# Patient Record
Sex: FEMALE | Race: WHITE | NOT HISPANIC OR LATINO | Employment: UNEMPLOYED | URBAN - NONMETROPOLITAN AREA
[De-identification: names, ages, dates, MRNs, and addresses within clinical notes are randomized per-mention and may not be internally consistent; named-entity substitution may affect disease eponyms.]

---

## 2022-05-17 ENCOUNTER — HOSPITAL ENCOUNTER (EMERGENCY)
Facility: HOSPITAL | Age: 22
Discharge: LEFT BEFORE DISCHARGE | End: 2022-05-17
Attending: EMERGENCY MEDICINE

## 2022-05-17 VITALS
RESPIRATION RATE: 20 BRPM | SYSTOLIC BLOOD PRESSURE: 141 MMHG | WEIGHT: 138.67 LBS | DIASTOLIC BLOOD PRESSURE: 96 MMHG | HEART RATE: 99 BPM | OXYGEN SATURATION: 98 % | HEIGHT: 62 IN | BODY MASS INDEX: 25.52 KG/M2 | TEMPERATURE: 97.9 F

## 2022-05-17 DIAGNOSIS — O46.90 VAGINAL BLEEDING IN PREGNANCY, UNSPECIFIED TRIMESTER: Primary | ICD-10-CM

## 2022-05-17 LAB
ABO GROUP BLD: NORMAL
ABO GROUP BLD: NORMAL
ALBUMIN SERPL BCP-MCNC: 2.6 G/DL (ref 3.5–5)
ALP SERPL-CCNC: 208 U/L (ref 46–116)
ALT SERPL W P-5'-P-CCNC: 30 U/L (ref 12–78)
ANION GAP SERPL CALCULATED.3IONS-SCNC: 12 MMOL/L (ref 4–13)
AST SERPL W P-5'-P-CCNC: 14 U/L (ref 5–45)
BASOPHILS # BLD MANUAL: 0 THOUSAND/UL (ref 0–0.1)
BASOPHILS NFR MAR MANUAL: 0 % (ref 0–1)
BILIRUB SERPL-MCNC: 0.27 MG/DL (ref 0.2–1)
BLD GP AB SCN SERPL QL: NEGATIVE
BUN SERPL-MCNC: 9 MG/DL (ref 5–25)
CALCIUM ALBUM COR SERPL-MCNC: 10 MG/DL (ref 8.3–10.1)
CALCIUM SERPL-MCNC: 8.9 MG/DL (ref 8.3–10.1)
CHLORIDE SERPL-SCNC: 101 MMOL/L (ref 100–108)
CO2 SERPL-SCNC: 23 MMOL/L (ref 21–32)
CREAT SERPL-MCNC: 0.65 MG/DL (ref 0.6–1.3)
EOSINOPHIL # BLD MANUAL: 0.66 THOUSAND/UL (ref 0–0.4)
EOSINOPHIL NFR BLD MANUAL: 2 % (ref 0–6)
ERYTHROCYTE [DISTWIDTH] IN BLOOD BY AUTOMATED COUNT: 13.8 % (ref 11.6–15.1)
GFR SERPL CREATININE-BSD FRML MDRD: 126 ML/MIN/1.73SQ M
GIANT PLATELETS BLD QL SMEAR: PRESENT
GLUCOSE SERPL-MCNC: 118 MG/DL (ref 65–140)
HCT VFR BLD AUTO: 33.5 % (ref 34.8–46.1)
HGB BLD-MCNC: 11.1 G/DL (ref 11.5–15.4)
LYMPHOCYTES # BLD AUTO: 18 % (ref 14–44)
LYMPHOCYTES # BLD AUTO: 5.97 THOUSAND/UL (ref 0.6–4.47)
MCH RBC QN AUTO: 29 PG (ref 26.8–34.3)
MCHC RBC AUTO-ENTMCNC: 33.1 G/DL (ref 31.4–37.4)
MCV RBC AUTO: 88 FL (ref 82–98)
MONOCYTES # BLD AUTO: 0.66 THOUSAND/UL (ref 0–1.22)
MONOCYTES NFR BLD: 2 % (ref 4–12)
NEUTROPHILS # BLD MANUAL: 25.86 THOUSAND/UL (ref 1.85–7.62)
NEUTS SEG NFR BLD AUTO: 78 % (ref 43–75)
OVALOCYTES BLD QL SMEAR: PRESENT
PLATELET # BLD AUTO: 360 THOUSANDS/UL (ref 149–390)
PLATELET BLD QL SMEAR: ADEQUATE
PMV BLD AUTO: 10.8 FL (ref 8.9–12.7)
POLYCHROMASIA BLD QL SMEAR: PRESENT
POTASSIUM SERPL-SCNC: 3.3 MMOL/L (ref 3.5–5.3)
PROT SERPL-MCNC: 7.3 G/DL (ref 6.4–8.2)
RBC # BLD AUTO: 3.83 MILLION/UL (ref 3.81–5.12)
RBC MORPH BLD: PRESENT
RH BLD: POSITIVE
RH BLD: POSITIVE
SODIUM SERPL-SCNC: 136 MMOL/L (ref 136–145)
SPECIMEN EXPIRATION DATE: NORMAL
WBC # BLD AUTO: 33.16 THOUSAND/UL (ref 4.31–10.16)

## 2022-05-17 PROCEDURE — 88305 TISSUE EXAM BY PATHOLOGIST: CPT | Performed by: PATHOLOGY

## 2022-05-17 PROCEDURE — 99285 EMERGENCY DEPT VISIT HI MDM: CPT | Performed by: EMERGENCY MEDICINE

## 2022-05-17 PROCEDURE — 86850 RBC ANTIBODY SCREEN: CPT | Performed by: EMERGENCY MEDICINE

## 2022-05-17 PROCEDURE — 85007 BL SMEAR W/DIFF WBC COUNT: CPT | Performed by: EMERGENCY MEDICINE

## 2022-05-17 PROCEDURE — 80053 COMPREHEN METABOLIC PANEL: CPT | Performed by: EMERGENCY MEDICINE

## 2022-05-17 PROCEDURE — 85025 COMPLETE CBC W/AUTO DIFF WBC: CPT | Performed by: EMERGENCY MEDICINE

## 2022-05-17 PROCEDURE — 36415 COLL VENOUS BLD VENIPUNCTURE: CPT | Performed by: EMERGENCY MEDICINE

## 2022-05-17 PROCEDURE — 99284 EMERGENCY DEPT VISIT MOD MDM: CPT

## 2022-05-17 PROCEDURE — 96360 HYDRATION IV INFUSION INIT: CPT

## 2022-05-17 PROCEDURE — 86900 BLOOD TYPING SEROLOGIC ABO: CPT | Performed by: EMERGENCY MEDICINE

## 2022-05-17 PROCEDURE — 59409 OBSTETRICAL CARE: CPT | Performed by: EMERGENCY MEDICINE

## 2022-05-17 PROCEDURE — 85027 COMPLETE CBC AUTOMATED: CPT | Performed by: EMERGENCY MEDICINE

## 2022-05-17 PROCEDURE — 86901 BLOOD TYPING SEROLOGIC RH(D): CPT | Performed by: EMERGENCY MEDICINE

## 2022-05-17 RX ORDER — ONDANSETRON 4 MG/1
4 TABLET, ORALLY DISINTEGRATING ORAL ONCE
Status: COMPLETED | OUTPATIENT
Start: 2022-05-17 | End: 2022-05-17

## 2022-05-17 RX ORDER — POTASSIUM CHLORIDE 20 MEQ/1
40 TABLET, EXTENDED RELEASE ORAL ONCE
Status: COMPLETED | OUTPATIENT
Start: 2022-05-17 | End: 2022-05-17

## 2022-05-17 RX ORDER — ACETAMINOPHEN 325 MG/1
650 TABLET ORAL ONCE
Status: COMPLETED | OUTPATIENT
Start: 2022-05-17 | End: 2022-05-17

## 2022-05-17 RX ORDER — NIFEDIPINE 10 MG/1
20 CAPSULE ORAL ONCE
Status: DISCONTINUED | OUTPATIENT
Start: 2022-05-17 | End: 2022-05-17

## 2022-05-17 RX ORDER — ONDANSETRON 4 MG/1
4 TABLET, ORALLY DISINTEGRATING ORAL EVERY 6 HOURS PRN
Qty: 20 TABLET | Refills: 0 | Status: SHIPPED | OUTPATIENT
Start: 2022-05-17

## 2022-05-17 RX ADMIN — ONDANSETRON 4 MG: 4 TABLET, ORALLY DISINTEGRATING ORAL at 07:55

## 2022-05-17 RX ADMIN — SODIUM CHLORIDE 1000 ML: 0.9 INJECTION, SOLUTION INTRAVENOUS at 05:43

## 2022-05-17 RX ADMIN — ACETAMINOPHEN 650 MG: 325 TABLET ORAL at 05:32

## 2022-05-17 RX ADMIN — POTASSIUM CHLORIDE 40 MEQ: 20 TABLET, EXTENDED RELEASE ORAL at 07:10

## 2022-05-17 NOTE — DISCHARGE INSTRUCTIONS
Thank you for letting us take care of you  You have been evaluated for vaginal bleeding and abdominal pain in pregnancy  You like to leave against medical advice  Please follow-up with OBGYN  A referral has been placed urgently  Please return if you change your mind

## 2022-05-17 NOTE — ED NOTES
Vaginal delivery occurred at 0558, baby and placenta delivered simultaneously  Scant amount of vaginal present at this time  VSS        Leopoldo Dane, RN  05/17/22 6468 [facility] Franci, RN  05/17/22 3611

## 2022-05-17 NOTE — ED PROVIDER NOTES
History  Chief Complaint   Patient presents with    Abdominal Pain Pregnant     Patient reports vaginal bleeding and abdominal pain beginning a few hours ago  States she took a pregnancy test "a couple months ago" but had spotting in between so she didn't know if she was pregnant or not  Thought she had a miscarriage previously  51-year-old  at estimated 25 weeks gestation by uterus fundus on exam who presents to the emergency department for vaginal bleeding and abdominal pain that began a few hours ago  States that she thought she was pregnant in December and had a miscarriage  States that she has had intermittent spotting each month since December did not think she was pregnant  Reports seeing a clot about 2 hours ago from her vagina and then having small amount of vaginal bleeding followed by abdominal pains that have been coming and going every 5-10 minutes  Patient denies any chest pain or shortness of breath  Reports no complications in her previous pregnancy  Denies any lightheadedness and dizziness  Denied taking medications for pain prior to arrival   Reports she did not have any prenatal care if she is pregnant  No other concerns  None       History reviewed  No pertinent past medical history  Past Surgical History:   Procedure Laterality Date    CHOLECYSTECTOMY      TONSILLECTOMY Bilateral        History reviewed  No pertinent family history  I have reviewed and agree with the history as documented      E-Cigarette/Vaping    E-Cigarette Use Never User      E-Cigarette/Vaping Substances     Social History     Tobacco Use    Smoking status: Current Every Day Smoker     Packs/day: 0 50     Types: Cigarettes    Smokeless tobacco: Never Used   Vaping Use    Vaping Use: Never used   Substance Use Topics    Alcohol use: Not Currently    Drug use: Yes     Types: Marijuana       Review of Systems   Constitutional: Negative for activity change, appetite change, chills and fever    HENT: Negative for congestion, rhinorrhea and sore throat  Eyes: Negative for visual disturbance  Respiratory: Negative for chest tightness, shortness of breath and wheezing  Cardiovascular: Negative for chest pain, palpitations and leg swelling  Gastrointestinal: Positive for abdominal pain  Negative for constipation, diarrhea, nausea and vomiting  Genitourinary: Positive for pelvic pain and vaginal bleeding  Negative for dysuria, flank pain, vaginal discharge and vaginal pain  Musculoskeletal: Negative for back pain and neck pain  Skin: Negative for rash  Neurological: Negative for weakness, numbness and headaches  Psychiatric/Behavioral: Negative for behavioral problems  Physical Exam  Physical Exam  Vitals and nursing note reviewed  Constitutional:       General: She is not in acute distress  Appearance: She is well-developed  She is not diaphoretic  HENT:      Head: Normocephalic and atraumatic  Right Ear: External ear normal       Left Ear: External ear normal       Nose: Nose normal    Eyes:      Pupils: Pupils are equal, round, and reactive to light  Cardiovascular:      Rate and Rhythm: Normal rate and regular rhythm  Heart sounds: Normal heart sounds  Pulmonary:      Effort: Pulmonary effort is normal  No respiratory distress  Breath sounds: Normal breath sounds  No wheezing or rales  Abdominal:      General: Bowel sounds are normal       Palpations: Abdomen is soft  Tenderness: There is no abdominal tenderness  Comments: Point of care ultrasound completed at bedside which revealed IUP with fetal heart rate at 130-140 beats per minute    Fundus height of uterus consistent with 24-26 weeks gestation   Genitourinary:     Comments: No active hemorrhage but did see oozing of blood in the vaginal canal  Musculoskeletal:         General: No tenderness or deformity  Normal range of motion        Cervical back: Normal range of motion and neck supple  Skin:     General: Skin is warm and dry  Capillary Refill: Capillary refill takes less than 2 seconds  Neurological:      Mental Status: She is alert and oriented to person, place, and time  Motor: No abnormal muscle tone           Vital Signs  ED Triage Vitals [05/17/22 0520]   Temperature Pulse Respirations Blood Pressure SpO2   98 6 °F (37 °C) (!) 136 (!) 24 136/97 98 %      Temp Source Heart Rate Source Patient Position - Orthostatic VS BP Location FiO2 (%)   Temporal Monitor Lying Right arm --      Pain Score       10 - Worst Possible Pain           Vitals:    05/17/22 0645 05/17/22 0700 05/17/22 0715 05/17/22 0730   BP: 146/90 161/97 138/85 141/96   Pulse: 104 (!) 108 101 99   Patient Position - Orthostatic VS: Sitting  Sitting          Visual Acuity      ED Medications  Medications   ondansetron (ZOFRAN-ODT) dispersible tablet 4 mg (has no administration in time range)   acetaminophen (TYLENOL) tablet 650 mg (650 mg Oral Given 5/17/22 0532)   sodium chloride 0 9 % bolus 1,000 mL (0 mL Intravenous Stopped 5/17/22 0649)   potassium chloride (K-DUR,KLOR-CON) CR tablet 40 mEq (40 mEq Oral Given 5/17/22 0710)       Diagnostic Studies  Results Reviewed     Procedure Component Value Units Date/Time    Manual Differential(PHLEBS Do Not Order) [[id number]]  (Abnormal) Collected: 05/17/22 0530    Lab Status: Final result Specimen: Blood from Arm, Right Updated: 05/17/22 0747     Segmented % 78 %      Lymphocytes % 18 %      Monocytes % 2 %      Eosinophils, % 2 %      Basophils % 0 %      Absolute Neutrophils 25 86 Thousand/uL      Lymphocytes Absolute 5 97 Thousand/uL      Monocytes Absolute 0 66 Thousand/uL      Eosinophils Absolute 0 66 Thousand/uL      Basophils Absolute 0 00 Thousand/uL      Total Counted --     RBC Morphology Present     Ovalocytes Present     Polychromasia Present     Platelet Estimate Adequate     Giant PLTs Present    Comprehensive metabolic panel [[id number]]  (Abnormal) Collected: 05/17/22 0530    Lab Status: Final result Specimen: Blood from Arm, Right Updated: 05/17/22 0552     Sodium 136 mmol/L      Potassium 3 3 mmol/L      Chloride 101 mmol/L      CO2 23 mmol/L      ANION GAP 12 mmol/L      BUN 9 mg/dL      Creatinine 0 65 mg/dL      Glucose 118 mg/dL      Calcium 8 9 mg/dL      Corrected Calcium 10 0 mg/dL      AST 14 U/L      ALT 30 U/L      Alkaline Phosphatase 208 U/L      Total Protein 7 3 g/dL      Albumin 2 6 g/dL      Total Bilirubin 0 27 mg/dL      eGFR 126 ml/min/1 73sq m     Narrative:      [place] guidelines for Chronic Kidney Disease (CKD):     Stage 1 with normal or high GFR (GFR > 90 mL/min/1 73 square meters)    Stage 2 Mild CKD (GFR = 60-89 mL/min/1 73 square meters)    Stage 3A Moderate CKD (GFR = 45-59 mL/min/1 73 square meters)    Stage 3B Moderate CKD (GFR = 30-44 mL/min/1 73 square meters)    Stage 4 Severe CKD (GFR = 15-29 mL/min/1 73 square meters)    Stage 5 End Stage CKD (GFR <15 mL/min/1 73 square meters)  Note: GFR calculation is accurate only with a steady state creatinine    CBC and differential [[id number]]  (Abnormal) Collected: 05/17/22 0530    Lab Status: Final result Specimen: Blood from Arm, Right Updated: 05/17/22 0540     WBC 33 16 Thousand/uL      RBC 3 83 Million/uL      Hemoglobin 11 1 g/dL      Hematocrit 33 5 %      MCV 88 fL      MCH 29 0 pg      MCHC 33 1 g/dL      RDW 13 8 %      MPV 10 8 fL      Platelets 598 Thousands/uL     Narrative: This is an appended report  These results have been appended to a previously verified report      Urinalysis with microscopic [[id number]]     Lab Status: No result Specimen: Urine                  No orders to display              Procedures  ED OB Delivery    Date/Time: 5/17/2022 5:50 AM  Performed by: Lydia Quiñonez MD  Authorized by: Lydia Quiñonez MD     Patient location:  ED  Other Assisting Provider: Yes (comment)    Pre-procedure details:     Consent obtained:  Emergent situation    Consent given by:  Patient    Alternatives discussed:  Alternative treatment, delayed treatment, observation and no treatment    Immediately prior to procedure, a time out was called: No (emergent situation)      Patient identity confirmed:  Verbally with patient    Indications:  Contractions with Vaginal Bleeding  Labor details:      labor?: Yes      Premature ROM?: Yes      Amniotic Fluid:  Not assessed    Episiotomy: No      Laceration(s): No      Labor complications: precipitous delivery    [place] Delivery:     Delivery time:  2022 5:58 AM    Presentation:  Vertex    [place] interventions:  Blow-by oxygen, bulb syringe, drying, radiant warmer, tactile stimulation, wrapped in blankets, warmed and dried and suctioning    APGAR score 5 min:  6    APGAR score 10 min:  10    [place] Disposition:  Nurse  Cord:     Complications: none      Nuchal intervention: clamped and cut    Placenta:     Placenta delivery time:  2022 5:59 AM    Removal:  Spontaneous    Appearance: intact      Disposition:  Histopathology  Post-Delivery details:     Post-partum hemorrhage: No      Post-partum hemorrhage evaluation: cervix re-evaluated and vagina re-evaluated      Post-partum treatment interventions: massage      Estimated blood loss: minimal          ED Course           SBIRT 20yo+    Flowsheet Row Most Recent Value   SBIRT (23 yo +)    In order to provide better care to our patients, we are screening all of our patients for alcohol and drug use  Would it be okay to ask you these screening questions? Yes Filed at: 2022 7920   Initial Alcohol Screen: US AUDIT-C     1  How often do you have a drink containing alcohol? 0 Filed at: 2022   2  How many drinks containing alcohol do you have on a typical day you are drinking? 0 Filed at: 2022   3a  Male UNDER 65: How often do you have five or more drinks on one occasion? 0 Filed at: 2022   3b   FEMALE Any Age, or MALE 65+: How often do you have 4 or more drinks on one occassion? 0 Filed at: 2022 0520   Audit-C Score 0 Filed at: 2022 7874   ALYSIA: How many times in the past year have you    Used an illegal drug or used a prescription medication for non-medical reasons? Never Filed at: 2022 5220              MDM  Number of Diagnoses or Management Options  Delivery of   Vaginal bleeding in pregnancy, unspecified trimester  Diagnosis management comments: 51-year-old  at estimated 25 weeks gestation by uterus fundus on exam who presents to the emergency department for vaginal bleeding and abdominal pain that began a few hours ago  Patient did not know she was pregnant on arrival   Reported intermittent spotting since December when she thought she had a miscarriage  Vital signs on arrival were notable for tachycardia with heart rate in the 130s but blood pressure is maintained in the 130s over 90s  Otherwise vital signs were non concerning  Patient was noted to have uterus fundus at about 25 weeks on exam   Point of care ultrasound completed which showed fetal heart rate in the 130s to 140s  Patient did have mild oozing of blood from the vaginal canal   Lab work ordered a on arrival including type and screen, abdominal labs and patient was given Tylenol for supportive care along with IV fluids  Lab work returned showing leukocytosis of 33 K but otherwise showed no acute concerns  Patient states that she is blood type Rh positive  Patient had intermittent contraction type pains every 4 minutes on arrival   These contractions became more frequent to every 2 minutes over the course of an hour  OBGYN on-call, Dr Vero Hodge, was contacted as soon as patient arrived and transfer center was contacted as well   Given possibility that patient's pedis may be pre term, patient was recommended to be sent to  [facility] though patient preferred closest local hospital   Patient's mother was initially agreeable to have patient transferred to [facility] as she is not deliver at that time  Patient was initially accepted by Dr Merlin Stai and EMTALA signed  Attempted to have transportation arranged expeditiously  Transportation was the 1 hour away  Dr Merlin Stai did recommend tocolytic, 20 mg Procardia which was ordered but not given  As we were waiting on transportation, patient contractions became more frequent and she ultimately delivered at 5:58 a m     See procedure note above  Placenta delivered soon after patient had no complications from vaginal delivery including any vaginal lacerations or postpartum hemorrhage  Baby was born subsequently and moved to a warmer as patient herself informed us that she does not plan on keeping baby, did not want skin skin contact and would like to put baby up for adoption  Please see other chart for newborns care (Heriberto Lopez Girl #[id number])  Once baby was delivered, patient changed her mind about having herself transferred to [facility] and she now wanted to go to the closest hospital, which she stated was [facility]  Transfer center was informed and transfer was about to be set up for new hospital for patient  However patient then decided that she is currently stable and does not feel the need to be transferred  States that she would prefer to go to an OBGYN later as she wants to go home to her 3year-old son  Patient understood the risks of leaving without being seen by an OBGYN including possible continued vaginal bleeding, infection, retained products of conception, or or other delivery complications  Patient signed out AMA  Patient did sign EMTALA form for patient's  before leaving  OBGYN referral was placed urgently  CPS was contacted Nathan Boone ) and informed of situation as well  Patient's significant other was here with her during this visit  He also understood risks and benefits    Patient did vomit before leaving  Zofran given and patient was prescribed Zofran as well  Critical Care Time:  60 minutes critical care time by me not including procedures    Critical Care Diagnosis:  Vaginal bleeding and unknown pregnancy with abdominal pain and  delivery  Treatment of Critical Conditions:   delivery, monitoring, IV fluids, medications         Amount and/or Complexity of Data Reviewed  Clinical lab tests: ordered and reviewed  Tests in the medicine section of CPT®: ordered and reviewed  Discussion of test results with the performing providers: yes  Decide to obtain previous medical records or to obtain history from someone other than the patient: yes  Obtain history from someone other than the patient: yes  Discuss the patient with other providers: yes  Independent visualization of images, tracings, or specimens: yes    Risk of Complications, Morbidity, and/or Mortality  Presenting problems: high  Diagnostic procedures: high  Management options: high        Disposition  Final diagnoses:   Vaginal bleeding in pregnancy, unspecified trimester   Delivery of      Time reflects when diagnosis was documented in both MDM as applicable and the Disposition within this note     Time User Action Codes Description Comment    2022  5:29 AM Tono SOUZA Add [O46 90] Vaginal bleeding in pregnancy, unspecified trimester     2022  6:42 AM Uday Luque Add [O80] Delivery of        ED Disposition     ED Disposition   AMA    Condition   --    Date/Time   Tue May 17, 2022  7:13 AM    Comment              MD Documentation    Flowsheet Row Most Recent Value   Patient Condition The patient has been stabilized such that within reasonable medical probability, no material deterioration of the patient condition or the condition of the unborn child(alber) is likely to result from the transfer, Pregnant woman having contractions   Reason for Transfer Level of Care needed not available at this facility  [OBGYN]   Benefits of Transfer Specialized equipment and/or services available at the receiving facility (Include comment)________________________  [OBGYN]   Risks of Transfer Potential for delay in receiving treatment, Potential deterioration of medical condition, Loss of IV, Increased discomfort during transfer, Possible worsening of condition or death during transfer   Accepting Physician Dr Magdalena Card Name, [place], [place]   Provider Certification General risk, such as traffic hazards, adverse weather conditions, rough terrain or turbulence, possible failure of equipment (including vehicle or aircraft), or consequences of actions of persons outside the control of the transport personnel, Unanticipated needs of medical equipment and personnel during transport, Risk of worsening condition, The possibility of a transport vehicle being unavailable      RN Documentation    Flowsheet Row Most [facility] Name, [address] [place] [address]      Follow-up Information     Follow up With Specialties Details Why Contact Info    OBGYN              Patient's Medications   Discharge Prescriptions    ONDANSETRON (ZOFRAN ODT) 4 MG DISINTEGRATING TABLET    Take 1 tablet (4 mg total) by mouth every 6 (six) hours as needed for nausea or vomiting       Start Date: 5/17/2022 End Date: --       Order Dose: 4 mg       Quantity: 20 tablet    Refills: 0           PDMP Review     None          ED Provider  Electronically Signed by           Barbara Díaz MD  05/17/22 7807

## 2022-05-17 NOTE — EMTALA/ACUTE CARE TRANSFER
[address]  [facility] [address]  Dept: [phone]      EMTALA TRANSFER CONSENT    NAME Lucila Noyola                                         2000                              MRN [id number]    I have been informed of my rights regarding examination, treatment, and transfer   by Dr Noemi Yoon MD    Benefits: Specialized equipment and/or services available at the receiving facility (Include comment)________________________ (OBGYN)    Risks: Potential for delay in receiving treatment, Potential deterioration of medical condition, Loss of IV, Increased discomfort during transfer, Possible worsening of condition or death during transfer      Consent for Transfer:  I acknowledge that my medical condition has been evaluated and explained to me by the emergency department physician or other qualified medical person and/or my attending physician, who has recommended that I be transferred to the service of  Accepting Physician: Dr Brandi Baldwin at [address] [place] [address] Name, [facility] : Areta Landau; [place], [place]  The above potential benefits of such transfer, the potential risks associated with such transfer, and the probable risks of not being transferred have been explained to me, and I fully understand them  The doctor has explained that, in my case, the benefits of transfer outweigh the risks  I agree to be transferred  I authorize the performance of emergency medical procedures and treatments upon me in both transit and upon arrival at the receiving facility  Additionally, I authorize the release of any and all medical records to the receiving facility and request they be transported with me, if possible  I understand that the safest mode of transportation during a medical emergency is an ambulance and that the Hospital advocates the use of this mode of transport   Risks of traveling to the receiving facility by car, including absence of medical control, life sustaining equipment, such as oxygen, and medical personnel has been explained to me and I fully understand them  (CEM CORRECT BOX BELOW)  [  ]  I consent to the stated transfer and to be transported by ambulance/helicopter  [  ]  I consent to the stated transfer, but refuse transportation by ambulance and accept full responsibility for my transportation by car  I understand the risks of non-ambulance transfers and I exonerate the Hospital and its staff from any deterioration in my condition that results from this refusal     X___________________________________________    DATE  22  TIME________  Signature of patient or legally responsible individual signing on patient behalf           RELATIONSHIP TO PATIENT_________________________          Provider Certification    NAME Bakari GriffinB 2000                              MRN [id number]    A medical screening exam was performed on the above named patient  Based on the examination:    Condition Necessitating Transfer The encounter diagnosis was Vaginal bleeding in pregnancy, unspecified trimester  Patient Condition: The patient has been stabilized such that within reasonable medical probability, no material deterioration of the patient condition or the condition of the unborn child(alber) is likely to result from the transfer, Pregnant woman having contractions    Reason for Transfer: Level of Care needed not available at this facility (OBGYN)    Transfer Requirements: [facility];  [place], [address]   · Space available and qualified personnel available for treatment as acknowledged by    · Agreed to accept transfer and to provide appropriate medical treatment as acknowledged by       Dr Martha Montoya  · Appropriate medical records of the examination and treatment of the patient are provided at the time of transfer   [address] _______  · Transfer will be performed by qualified personnel from    and appropriate transfer equipment as required, including the use of necessary and appropriate life support measures  Provider Certification: I have examined the patient and explained the following risks and benefits of being transferred/refusing transfer to the patient/family:  General risk, such as traffic hazards, adverse weather conditions, rough terrain or turbulence, possible failure of equipment (including vehicle or aircraft), or consequences of actions of persons outside the control of the transport personnel, Unanticipated needs of medical equipment and personnel during transport, Risk of worsening condition, The possibility of a transport vehicle being unavailable      Based on these reasonable risks and benefits to the patient and/or the unborn child(alber), and based upon the information available at the time of the patients examination, I certify that the medical benefits reasonably to be expected from the provision of appropriate medical treatments at another medical facility outweigh the increasing risks, if any, to the individuals medical condition, and in the case of labor to the unborn child, from effecting the transfer      X____________________________________________ DATE 05/17/22        TIME_______      ORIGINAL - SEND TO MEDICAL RECORDS   COPY - SEND WITH PATIENT DURING TRANSFER